# Patient Record
(demographics unavailable — no encounter records)

---

## 2025-02-03 NOTE — HISTORY OF PRESENT ILLNESS
[de-identified] : Patient is a 13-year-old male accompanied by mother here for evaluation of left hand/wrist.  Patient states earlier today at school his friend fell and he tried to grab his friend.  Patient states that he caught in between his friend and the wall and hit his wrist.  Patient states that he was immediate pain was seen by the nurses office and was referred to be seen by urgent care orthopedics.  Denies numbness or tingling, denies instability  Left hand/wrist exam: No effusion no ecchymosis no erythema tenderness palpation of distal radius, mild decreased range of motion in all planes with guarding no gross instability neurovascular intact good capillary refill  X-ray left wrist 3 views: Acute nondisplaced transverse buckle fractures of the distal radius metaphyseal and distal ulnar metaphyseal  Discussed with patient and mother in detail that patient has distal radius and ulnar fracture, these fractures generally heal well on their own conservatively, fractures generally take about 6 to 8 weeks to fully heal although we do need to watch this fracture closely.  Placed patient in long-arm cast with good mold.  Do not get cast wet, use cast cover advised to move fingers and shoulder to avoid stiffness, patient fitted for sling to use as needed for comfort, no gym/sports, activity modification, Motrin/Tylenol as needed for pain, follow-up in 7 to 10 days for repeat left wrist x-rays, discussed red flag symptoms in detail call if any questions or concerns.  Patient mother understand agree with plan.

## 2025-02-19 NOTE — HISTORY OF PRESENT ILLNESS
[FreeTextEntry1] : 12 y/o male with a distal forearm fracture of radius and ulna, has been about a week since injury. Here today with cast intact.

## 2025-02-19 NOTE — PHYSICAL EXAM
[Not Examined] : not examined [Normal] : The patient is moving all extremities spontaneously without any gross neurologic deficits. They walk with a fluid nonantalgic gait. There are equal and symmetric deep tendon reflexes in the upper and lower extremities bilaterally. There is gross intact sensation to soft and light touch in the bilateral upper and lower extremities [de-identified] :  ISLT Intact Motor

## 2025-02-19 NOTE — PHYSICAL EXAM
[Not Examined] : not examined [Normal] : The patient is moving all extremities spontaneously without any gross neurologic deficits. They walk with a fluid nonantalgic gait. There are equal and symmetric deep tendon reflexes in the upper and lower extremities bilaterally. There is gross intact sensation to soft and light touch in the bilateral upper and lower extremities [de-identified] :  ISLT Intact Motor

## 2025-02-20 NOTE — END OF VISIT
[FreeTextEntry3] : A physician assistant/resident assisted with documenting the visit and acted as a scribe. I have seen and examined the patient, made my assessment and plan and have made all modifications necessary to the note.  Leslie Johnson MD Pediatric Orthopaedics Surgery [facility]

## 2025-02-20 NOTE — DATA REVIEWED
[de-identified] : X-rays of the left wrist taken in the office on 02/20/2025 were reviewed and independently interpreted: interval healing of non-displaced left distal radius buckle fracture and non-displaced distal ulnar fracture.

## 2025-02-20 NOTE — REASON FOR VISIT
[Initial Evaluation] : an initial evaluation [Patient] : patient [Mother] : mother [FreeTextEntry1] : L wrist fracture

## 2025-02-20 NOTE — HISTORY OF PRESENT ILLNESS
[FreeTextEntry1] : PATRICIA is a 13-year-old M who presents for evaluation for L wrist injury.  Date of injury: 02/03/25  Patient sustained trauma on 02/03/25 to his left wrist when he tried to grab his friend who was falling, and his hand got caught between his friend and the wall.  He was taken to [facility] the same day where XRs showed left distal radius buckle fracture and non-displaced distal ulnar fracture. Long arm cast was applied at the time. They went on to see Dr. Gray a week later and he recommended to continue the cast and immobilization. He is here for second opinion.

## 2025-02-20 NOTE — ASSESSMENT
[FreeTextEntry1] : PATRICIA is 13 year old male with non-displaced left distal radius buckle fracture and non-displaced distal ulnar fracture.   The condition, natural history, and prognosis were explained to the family. Today's visit included obtaining the history from the child and parent, due to the child's age, the child could not be considered a reliable historian, requiring the parent to act as an independent historian. The clinical findings and images were reviewed with the family.   X-rays of the left wrist taken in the office on 02/20/2025 were reviewed and independently interpreted: interval healing of non-displaced left distal radius buckle fracture and non-displaced distal ulnar fracture.   We discussed discontinuing the cast, but the family wants to continue using the cast for 1 more week as Patricia is very active. Recommend f/u in 1 week for XR OOC. No sports/gym/recess at this time. A school note was provided. We will discuss transition to wrist immobilizer or short arm cast in the next visit. Follow up recommended in my office in 1 weeks for clinical reassessment and XRs  Next visit: Left wrist X-rays OOC   All questions and concerns were addressed today. Family verbalizes understanding and agree with plan of care.  I, Darío Ochoa, have acted as a scribe and documented the above information for Dr. Johnson on 02/20/2025

## 2025-02-20 NOTE — PHYSICAL EXAM
[FreeTextEntry1] : Gait: Presents ambulating independently without signs of antalgia.  Good coordination and balance noted. Plantigrade foot with heel-to-toe progression. Neutral foot progression angle. GENERAL: Healthy appearing 13 year-old child. Alert, cooperative, in NAD SKIN: The skin is intact, warm, pink and dry over the area examined. EYES: Normal conjunctiva, normal eyelids and pupils were equal and round.  ENT: normal ears, normal nose and normal lips.  CARDIOVASCULAR: brisk capillary refill, but no peripheral edema.  RESPIRATORY: The patient is in no apparent respiratory distress. They're taking full deep breaths without use of accessory muscles or evidence of audible wheezes or stridor without the use of a stethoscope. Normal respiratory effort.  ABDOMEN: not examined  MUSCULOSKELETAL: Left long arm cast in place Edges well padded No evidence of skin breakdown in areas exposed +EPL/FPL/IO SILT M/U/R WWP distally

## 2025-02-25 NOTE — HISTORY OF PRESENT ILLNESS
[FreeTextEntry1] : 14 yo s/p BBFFx doing well in long arm cast but finds cast very itchy mother wants to switch cast

## 2025-03-12 NOTE — DISCUSSION/SUMMARY
[de-identified] : Chief complaint: Cast check  HPI: Patient is a 13-year-old male who presents the office today accompanied by his mother for a cast check as a result of patient experiencing left wrist pain for several minutes yesterday, 3/11/2025 without any new fall, injury, or trauma.  Patient reports that the pain resolved without intervention.  Since that time he has not had any new onset of pain.  He is able to move all fingers of the left hand.  No numbness or tingling reported.  No swelling, ecchymosis, or erythema reported.  ROS: Positive for left wrist pain  Physical examination of the left forearm, wrist, hand:  Well-fitting and molded short arm cast noted to the left upper extremity There is no appreciable edema, erythema, ecchymosis noted to the left elbow, exposed forearm, or digits of the left hand Patient is able to move all of the digits of the left hand without pain Distal sensation to all fingers of the left hand is intact Capillary refills less than 2 seconds  Assessment/plan: Fracture of the left distal radius and left distal ulna  1.  Patient was seen by Dr. Gray  on 2/25/2025, at that time Dr. Gray recommended follow-up in 3 weeks for repeat evaluation and likely for cast removal, at this time I recommend that the patient be brought back for his scheduled follow-up on 3/17/2025 as directed for repeat evaluation likely for cast removal 2.  Discussed with the patient with his mother that intermittent pain is expected after a fracture 3.  Discussed signs and symptoms of compartment syndrome with the patient and with his mother, explained that if any of the signs or symptoms manifest that the patient should be brought immediately to the emergency department  Discussed my recommendation for keeping the follow-up scheduled on 3/17/2025, patient and his mother verbalized understanding, they agree to follow-up as directed

## 2025-03-20 NOTE — REASON FOR VISIT
[Patient] : patient [Mother] : mother [Follow Up] : a follow up visit [FreeTextEntry1] : L wrist fracture

## 2025-03-20 NOTE — DATA REVIEWED
[de-identified] : X-rays of the left wrist taken at [facility] on 03/17/2025 were reviewed and independently interpreted:  interval healing of non-displaced left distal radius buckle fracture and non-displaced distal ulnar fracture.  X-rays of the left wrist taken in the office on 02/20/2025 were reviewed and independently interpreted: interval healing of non-displaced left distal radius buckle fracture and non-displaced distal ulnar fracture.

## 2025-03-20 NOTE — PHYSICAL EXAM
[FreeTextEntry1] : Gait: Presents ambulating independently without signs of antalgia.  Good coordination and balance noted. Plantigrade foot with heel-to-toe progression. Neutral foot progression angle. GENERAL: Healthy appearing 13 year-old child. Alert, cooperative, in NAD SKIN: The skin is intact, warm, pink and dry over the area examined. EYES: Normal conjunctiva, normal eyelids and pupils were equal and round.  ENT: normal ears, normal nose and normal lips.  CARDIOVASCULAR: brisk capillary refill, but no peripheral edema.  RESPIRATORY: The patient is in no apparent respiratory distress. They're taking full deep breaths without use of accessory muscles or evidence of audible wheezes or stridor without the use of a stethoscope. Normal respiratory effort.  ABDOMEN: not examined  MUSCULOSKELETAL: L wrist skin intact no significant TTP over fx site mild discomfort with wrist ROM +EPL/FPL/IO SILT M/U/R WWP distally

## 2025-03-20 NOTE — ASSESSMENT
[FreeTextEntry1] : PATRICIA is 13 year old male with non-displaced left distal radius buckle fracture and non-displaced distal ulnar fracture.   The condition, natural history, and prognosis were explained to the family. Today's visit included obtaining the history from the child and parent, due to the child's age, the child could not be considered a reliable historian, requiring the parent to act as an independent historian. The clinical findings and images were reviewed with the family.   X-rays of the left wrist taken at [facility] on 03/17/2025 were reviewed and independently interpreted:  interval healing of non-displaced left distal radius buckle fracture and non-displaced distal ulnar fracture.   Family is requesting brace for school to protect him from other students. He will wear the brace only for school Patient will work on ROM. No sports/gym/recess at this time. A school note was provided. Follow up recommended in my office or with [facility] in 3 weeks.  XR L wrist.   All questions and concerns were addressed today. Family verbalizes understanding and agree with plan of care.  I, Darío Ochoa, have acted as a scribe and documented the above information for Dr. Johnson on 02/20/2025

## 2025-03-20 NOTE — END OF VISIT
[FreeTextEntry3] : A physician assistant/resident assisted with documenting the visit and acted as a scribe. I have seen and examined the patient, made my assessment and plan and have made all modifications necessary to the note.  Leslie Johnson MD Pediatric Orthopaedics Surgery [facility]

## 2025-03-20 NOTE — HISTORY OF PRESENT ILLNESS
[FreeTextEntry1] : PATRICIA is a 13-year-old M who presents for evaluation for L wrist injury.  Date of injury: 02/03/25  Patient sustained trauma on 02/03/25 to his left wrist when he tried to grab his friend who was falling, and his hand got caught between his friend and the wall.  He was taken to [facility] the same day where XRs showed left distal radius buckle fracture and non-displaced distal ulnar fracture. Long arm cast was applied at the time. They went on to see Dr. Gray a week later and he recommended to continue the cast and immobilization  03/20/2025 -- patient seen on follow up. During his last visit with Dr Gray on 3/17/25, his short arm cast was removed. He reports his wrist hurts after the cast was removed

## 2025-03-25 NOTE — ASSESSMENT
[FreeTextEntry1] : X-rays look good, good healing. Cast was removed today. Follow up in 2 weeks with DWAINE Pichardo or Cecille for ROM check. Follow up in 3-4 months with me for physis check.

## 2025-03-25 NOTE — HISTORY OF PRESENT ILLNESS
[FreeTextEntry1] : 14 y/o male here with closed fracture of distal ends of left radius and ulna. Here today in cast.

## 2025-03-25 NOTE — HISTORY OF PRESENT ILLNESS
[FreeTextEntry1] : 12 y/o male here with closed fracture of distal ends of left radius and ulna. Here today in cast.